# Patient Record
Sex: FEMALE | Race: BLACK OR AFRICAN AMERICAN | NOT HISPANIC OR LATINO | URBAN - METROPOLITAN AREA
[De-identification: names, ages, dates, MRNs, and addresses within clinical notes are randomized per-mention and may not be internally consistent; named-entity substitution may affect disease eponyms.]

---

## 2020-06-07 ENCOUNTER — EMERGENCY (EMERGENCY)
Facility: HOSPITAL | Age: 55
LOS: 1 days | Discharge: HOME | End: 2020-06-07
Attending: EMERGENCY MEDICINE | Admitting: EMERGENCY MEDICINE
Payer: OTHER MISCELLANEOUS

## 2020-06-07 VITALS
RESPIRATION RATE: 20 BRPM | DIASTOLIC BLOOD PRESSURE: 120 MMHG | TEMPERATURE: 98 F | HEART RATE: 127 BPM | OXYGEN SATURATION: 100 % | SYSTOLIC BLOOD PRESSURE: 198 MMHG

## 2020-06-07 LAB
ALBUMIN SERPL ELPH-MCNC: 4.1 G/DL — SIGNIFICANT CHANGE UP (ref 3.3–5)
ALP SERPL-CCNC: 59 U/L — SIGNIFICANT CHANGE UP (ref 40–120)
ALT FLD-CCNC: 11 U/L — SIGNIFICANT CHANGE UP (ref 4–33)
ANION GAP SERPL CALC-SCNC: 13 MMO/L — SIGNIFICANT CHANGE UP (ref 7–14)
AST SERPL-CCNC: 16 U/L — SIGNIFICANT CHANGE UP (ref 4–32)
BASOPHILS # BLD AUTO: 0.07 K/UL — SIGNIFICANT CHANGE UP (ref 0–0.2)
BASOPHILS NFR BLD AUTO: 0.5 % — SIGNIFICANT CHANGE UP (ref 0–2)
BILIRUB SERPL-MCNC: 0.2 MG/DL — SIGNIFICANT CHANGE UP (ref 0.2–1.2)
BUN SERPL-MCNC: 13 MG/DL — SIGNIFICANT CHANGE UP (ref 7–23)
CALCIUM SERPL-MCNC: 9.2 MG/DL — SIGNIFICANT CHANGE UP (ref 8.4–10.5)
CHLORIDE SERPL-SCNC: 104 MMOL/L — SIGNIFICANT CHANGE UP (ref 98–107)
CO2 SERPL-SCNC: 24 MMOL/L — SIGNIFICANT CHANGE UP (ref 22–31)
CREAT SERPL-MCNC: 0.64 MG/DL — SIGNIFICANT CHANGE UP (ref 0.5–1.3)
EOSINOPHIL # BLD AUTO: 0.04 K/UL — SIGNIFICANT CHANGE UP (ref 0–0.5)
EOSINOPHIL NFR BLD AUTO: 0.3 % — SIGNIFICANT CHANGE UP (ref 0–6)
GLUCOSE SERPL-MCNC: 140 MG/DL — HIGH (ref 70–99)
HCT VFR BLD CALC: 42.2 % — SIGNIFICANT CHANGE UP (ref 34.5–45)
HGB BLD-MCNC: 13.9 G/DL — SIGNIFICANT CHANGE UP (ref 11.5–15.5)
IMM GRANULOCYTES NFR BLD AUTO: 0.3 % — SIGNIFICANT CHANGE UP (ref 0–1.5)
LYMPHOCYTES # BLD AUTO: 2.95 K/UL — SIGNIFICANT CHANGE UP (ref 1–3.3)
LYMPHOCYTES # BLD AUTO: 23 % — SIGNIFICANT CHANGE UP (ref 13–44)
MAGNESIUM SERPL-MCNC: 1.8 MG/DL — SIGNIFICANT CHANGE UP (ref 1.6–2.6)
MCHC RBC-ENTMCNC: 31.8 PG — SIGNIFICANT CHANGE UP (ref 27–34)
MCHC RBC-ENTMCNC: 32.9 % — SIGNIFICANT CHANGE UP (ref 32–36)
MCV RBC AUTO: 96.6 FL — SIGNIFICANT CHANGE UP (ref 80–100)
MONOCYTES # BLD AUTO: 0.53 K/UL — SIGNIFICANT CHANGE UP (ref 0–0.9)
MONOCYTES NFR BLD AUTO: 4.1 % — SIGNIFICANT CHANGE UP (ref 2–14)
NEUTROPHILS # BLD AUTO: 9.19 K/UL — HIGH (ref 1.8–7.4)
NEUTROPHILS NFR BLD AUTO: 71.8 % — SIGNIFICANT CHANGE UP (ref 43–77)
NRBC # FLD: 0 K/UL — SIGNIFICANT CHANGE UP (ref 0–0)
PHOSPHATE SERPL-MCNC: 2.8 MG/DL — SIGNIFICANT CHANGE UP (ref 2.5–4.5)
PLATELET # BLD AUTO: 207 K/UL — SIGNIFICANT CHANGE UP (ref 150–400)
PMV BLD: 11.1 FL — SIGNIFICANT CHANGE UP (ref 7–13)
POTASSIUM SERPL-MCNC: 3.7 MMOL/L — SIGNIFICANT CHANGE UP (ref 3.5–5.3)
POTASSIUM SERPL-SCNC: 3.7 MMOL/L — SIGNIFICANT CHANGE UP (ref 3.5–5.3)
PROT SERPL-MCNC: 7.1 G/DL — SIGNIFICANT CHANGE UP (ref 6–8.3)
RBC # BLD: 4.37 M/UL — SIGNIFICANT CHANGE UP (ref 3.8–5.2)
RBC # FLD: 13.2 % — SIGNIFICANT CHANGE UP (ref 10.3–14.5)
SODIUM SERPL-SCNC: 141 MMOL/L — SIGNIFICANT CHANGE UP (ref 135–145)
WBC # BLD: 12.82 K/UL — HIGH (ref 3.8–10.5)
WBC # FLD AUTO: 12.82 K/UL — HIGH (ref 3.8–10.5)

## 2020-06-07 PROCEDURE — 99218: CPT

## 2020-06-07 PROCEDURE — 72156 MRI NECK SPINE W/O & W/DYE: CPT | Mod: 26

## 2020-06-07 RX ORDER — TRAMADOL HYDROCHLORIDE 50 MG/1
50 TABLET ORAL ONCE
Refills: 0 | Status: DISCONTINUED | OUTPATIENT
Start: 2020-06-07 | End: 2020-06-07

## 2020-06-07 RX ORDER — MORPHINE SULFATE 50 MG/1
4 CAPSULE, EXTENDED RELEASE ORAL ONCE
Refills: 0 | Status: DISCONTINUED | OUTPATIENT
Start: 2020-06-07 | End: 2020-06-07

## 2020-06-07 RX ORDER — KETAMINE HYDROCHLORIDE 100 MG/ML
20 INJECTION INTRAMUSCULAR; INTRAVENOUS ONCE
Refills: 0 | Status: DISCONTINUED | OUTPATIENT
Start: 2020-06-07 | End: 2020-06-07

## 2020-06-07 RX ORDER — ACETAMINOPHEN 500 MG
650 TABLET ORAL ONCE
Refills: 0 | Status: COMPLETED | OUTPATIENT
Start: 2020-06-07 | End: 2020-06-07

## 2020-06-07 RX ORDER — KETOROLAC TROMETHAMINE 30 MG/ML
30 SYRINGE (ML) INJECTION ONCE
Refills: 0 | Status: DISCONTINUED | OUTPATIENT
Start: 2020-06-07 | End: 2020-06-07

## 2020-06-07 RX ORDER — LIDOCAINE 4 G/100G
1 CREAM TOPICAL ONCE
Refills: 0 | Status: COMPLETED | OUTPATIENT
Start: 2020-06-07 | End: 2020-06-07

## 2020-06-07 RX ORDER — KETOROLAC TROMETHAMINE 30 MG/ML
30 SYRINGE (ML) INJECTION ONCE
Refills: 0 | Status: DISCONTINUED | OUTPATIENT
Start: 2020-06-07 | End: 2020-06-08

## 2020-06-07 RX ORDER — IBUPROFEN 200 MG
600 TABLET ORAL ONCE
Refills: 0 | Status: COMPLETED | OUTPATIENT
Start: 2020-06-07 | End: 2020-06-07

## 2020-06-07 RX ORDER — AMLODIPINE BESYLATE 2.5 MG/1
2.5 TABLET ORAL DAILY
Refills: 0 | Status: DISCONTINUED | OUTPATIENT
Start: 2020-06-07 | End: 2020-06-11

## 2020-06-07 RX ORDER — DIAZEPAM 5 MG
5 TABLET ORAL ONCE
Refills: 0 | Status: DISCONTINUED | OUTPATIENT
Start: 2020-06-07 | End: 2020-06-07

## 2020-06-07 RX ADMIN — MORPHINE SULFATE 4 MILLIGRAM(S): 50 CAPSULE, EXTENDED RELEASE ORAL at 18:16

## 2020-06-07 RX ADMIN — Medication 30 MILLIGRAM(S): at 16:03

## 2020-06-07 RX ADMIN — LIDOCAINE 1 PATCH: 4 CREAM TOPICAL at 05:49

## 2020-06-07 RX ADMIN — Medication 650 MILLIGRAM(S): at 05:49

## 2020-06-07 RX ADMIN — Medication 600 MILLIGRAM(S): at 05:49

## 2020-06-07 RX ADMIN — Medication 5 MILLIGRAM(S): at 06:01

## 2020-06-07 RX ADMIN — LIDOCAINE 1 PATCH: 4 CREAM TOPICAL at 17:40

## 2020-06-07 RX ADMIN — TRAMADOL HYDROCHLORIDE 50 MILLIGRAM(S): 50 TABLET ORAL at 21:14

## 2020-06-07 RX ADMIN — MORPHINE SULFATE 4 MILLIGRAM(S): 50 CAPSULE, EXTENDED RELEASE ORAL at 09:45

## 2020-06-07 RX ADMIN — KETAMINE HYDROCHLORIDE 400 MILLIGRAM(S): 100 INJECTION INTRAMUSCULAR; INTRAVENOUS at 13:03

## 2020-06-07 NOTE — ED PROVIDER NOTE - ATTENDING CONTRIBUTION TO CARE
micah 53 y/o F with h/o chronic neck pain s/p work injury (pt follows with spine and pain management) here with worsening of chronic neck pain over the past few days.  Pt reports pain radiating to left shoulder, worse with any movement.  Pt reports she had last received steroid injection in April 2020 from pain management, referred back to her spine surgeon and is pending surgery.  No new injury or fall.  No fever, chills, rash, swelling, numbness, tingling, ha.  Pt standing next to stretcher, pacing and screaming, appears uncomfortable in pain.  Afeb, tachycardic, hypertensive.  NCAT, EOMI, PERRL.  Diffuse tenderness to left paracervical and along left neck to shoulder.  Lungs cta bl.  Cards +S1/S2, no MRG.  Distal radial pulses 2+ equal and full, good peripheral perfusion, skin warm and normal cap refill.  Sensation grossly normal in bilat upper ext,  Strength limited by pain, but able to move bilat ext equally with encouragement, gait steady.  No other spinal tenderness, pelvic stable.  Plan for pain control and reassess - likely exacerbation of underlying chronic pain, will need outpatient follow-up with her spine surgeon for definitive management.

## 2020-06-07 NOTE — ED PROVIDER NOTE - OBJECTIVE STATEMENT
53 y/o F w/ hx chronic neck pain x2 years, c-spine disc herniation after work injury 2018, presents with acute on chronic neck pain. States she has had multiple cervical steroid injections, last one was 3/28, which sometimes bring temporary relief. For the past 2 days however, neck pain has been gradually worsening, intermittent, exacerbated by movement. Last MRI 12/2019 53 y/o F w/ hx chronic neck pain x2 years, c-spine disc herniation after work injury 2018, presents with acute on chronic neck pain. States she has had multiple cervical steroid injections, last one was 3/28, which sometimes bring temporary relief. For the past 2 days however, neck pain has been gradually worsening, intermittent, exacerbated by movement. Last MRI 12/2019. Pain down L arm, numbness/tingling.    MRI 9/2019 ([facility]) impression: C3-C4 level disc bulge, degenerative spondylosis, prominent right sided endplate changes and uncovertebral joint hypertrophy, moderate impingement upon right neural foramen, impingement upon anterior thecal sac. C5-C6 broad-based disc bulge and posterior central focal herniation with annular fissure causing impingement upon anterior thecal sac and cord. C6-C7 level demonstrates broad-based disc bulge and left paracentral focal herniation with annular fissure, impingement.     Orthopedics: [facility] orth[facility]ics/center for pain management 53 y/o F w/ hx chronic neck pain x2 years, c-spine disc herniation after work injury 2018, presents with acute on chronic neck pain. States she has had multiple cervical steroid injections, last one was 3/28, which sometimes bring temporary relief. For the past 2 days however, neck pain has been gradually worsening, intermittent, exacerbated by movement. Last MRI 12/2019. Pain down L arm, numbness/tingling. Pt supposed to f/u with Spine surgeon Sushant, who will possibly do surgery on her cervical spine.     MRI 9/2019 ([facility]) impression: C3-C4 level disc bulge, degenerative spondylosis, prominent right sided endplate changes and uncovertebral joint hypertrophy, moderate impingement upon right neural foramen, impingement upon anterior thecal sac. C5-C6 broad-based disc bulge and posterior central focal herniation with annular fissure causing impingement upon anterior thecal sac and cord. C6-C7 level demonstrates broad-based disc bulge and left paracentral focal herniation with annular fissure, impingement.     Pmh: c-spine herniation  Meds: gabapentin, flexeril, fenoprofen, tramadol, amlodipine  Allergies:NKDA  Social: Cigarette use, denies illicit drug use  Surg: shoulder surgery    Orthopedics: [facility] orthoped[facility]/center for pain management  Spine surgeon: Jonathan Canchola MD ([facility])

## 2020-06-07 NOTE — ED CDU PROVIDER INITIAL DAY NOTE - OBJECTIVE STATEMENT
55 y/o F w/ hx chronic neck pain x2 years, c-spine disc herniation after work injury 2018, presents with acute on chronic neck pain. States she has had multiple cervical steroid injections, last one was 3/28, which sometimes bring temporary relief. For the past 2 days however, neck pain has been gradually worsening, intermittent, exacerbated by movement. Last MRI 12/2019. Pain down L arm, numbness/tingling. Pt supposed to f/u with Spine surgeon Sushant, who will possibly do surgery on her cervical spine.     cdu zach amado: hpi reviewed above. 54 y.o female with acute on chronic neck pain sent to CDU for MRI, pain control.

## 2020-06-07 NOTE — ED ADULT NURSE NOTE - OBJECTIVE STATEMENT
54y old female, a&ox4, ambulatory at baseline, c/o left sided shoulder pain radiating to neck. patient hysterical at this time, appears uncomfortable, stating she has spinal herniation and had epidural injection in March.  Denies trauma to area. Unable to complete questionnaire due to patient crying and yelling in pain. Medicated as per MD orders, appears in no respiratory distress at this time. Breathing equal and unlabored. MD [place]ak at bedside.

## 2020-06-07 NOTE — ED ADULT NURSE REASSESSMENT NOTE - NS ED NURSE REASSESS COMMENT FT1
Report received from day RN. Patient uncomfortable, reporting 10/10 pain. Medicated as per orders. Hot packs provided. CDU PA at bedside. VS as noted. Will continue to monitor.

## 2020-06-07 NOTE — ED ADULT NURSE NOTE - NSIMPLEMENTINTERV_GEN_ALL_ED
Implemented All Universal Safety Interventions:  [place] to call system. Call bell, personal items and telephone within reach. Instruct patient to call for assistance. Room bathroom lighting operational. Non-slip footwear when patient is off stretcher. Physically safe environment: no spills, clutter or unnecessary equipment. Stretcher in lowest position, wheels locked, appropriate side rails in place.

## 2020-06-07 NOTE — ED ADULT NURSE NOTE - NS PRO PASSIVE SMOKE EXP
Hypertension is well controlled.   Continue current treatment regimen.  Dietary sodium restriction.  Continue current medications.  Blood pressure will be reassessed at the next regular appointment.   Unknown

## 2020-06-07 NOTE — ED PROVIDER NOTE - CLINICAL SUMMARY MEDICAL DECISION MAKING FREE TEXT BOX
53 y/o F w/ acute on chronic neck pain, cervical disk herniation/degeneration 2018, treated at [facility]/pain management. No recent injury/trauma/fall. Steroid injections in neck, last one 03/2020. PEx reveals mild cervical tenderness, weakness left hand . Symptomatic control with ibuprofen, APAP, lidopatch, valium. Reassess. 53 y/o F w/ acute on chronic neck pain.  Pt appears uncomfortable, but moving all extremities.  LUE NVI, strength limited by pain but normal with good effort.  Will pain control, reassess.

## 2020-06-07 NOTE — ED ADULT TRIAGE NOTE - CHIEF COMPLAINT QUOTE
neck and shoulder  pain    pt has hx of cervical spine herniation and herniation... had epidural steroid injection on Monday... states pain from left side of neck rad to shoulder down arm to hand ... worsened last night with numbness, tingling. pt uncomfortable, tearful

## 2020-06-07 NOTE — ED ADULT NURSE REASSESSMENT NOTE - NS ED NURSE REASSESS COMMENT FT1
break coverage RN: pt received back from MRI, pt still c/o 10/10 pain. notified Dr. Anne of pt pain. Resp even and unlabored, VS as noted. Provided pt with pillows and assisted with repositioning for comfort. pending MRI results at this time. will continue to monitor.

## 2020-06-07 NOTE — ED PROVIDER NOTE - PHYSICAL EXAMINATION
[Const] pt uncomfortable, tearful, requesting more pain medications  [HEENT] PERRL, EOMI, moist mucus membranes  [Neck] Supple, trachea midline  [CV] +S1/S2, no m/r/g appreciated  [Lungs] Clear to auscultations bilaterally, no adventitious lung sounds  [Abd] soft, non-tender, nondistended in all 4 quadrants  [MSK] mild cervical midline tenderness at C3-C4 level, mild left paraspinal tenderness to palpation, +spurling, rt hand  5/5, left hand  4/5  [Skin] warm, dry, well-perfused  [Neuro] A&Ox3, Cranial Nerves II-XII intact [Const] pt uncomfortable, tearful, requesting more pain medications  [HEENT] PERRL, EOMI, moist mucus membranes  [Neck] Supple, trachea midline  [CV] +S1/S2, no m/r/g appreciated  [Lungs] Clear to auscultations bilaterally, no adventitious lung sounds  [Abd] soft, non-tender, nondistended in all 4 quadrants  [MSK] L paracervical ttp, L trapezius ttp, no gross deformities  [Skin] warm, dry, well-perfused  [Neuro] A&Ox3, Cranial Nerves II-XII intact

## 2020-06-08 VITALS
SYSTOLIC BLOOD PRESSURE: 136 MMHG | TEMPERATURE: 99 F | RESPIRATION RATE: 17 BRPM | DIASTOLIC BLOOD PRESSURE: 97 MMHG | HEART RATE: 84 BPM | OXYGEN SATURATION: 98 %

## 2020-06-08 LAB — SARS-COV-2 RNA SPEC QL NAA+PROBE: SIGNIFICANT CHANGE UP

## 2020-06-08 PROCEDURE — 99217: CPT

## 2020-06-08 RX ORDER — KETOROLAC TROMETHAMINE 30 MG/ML
30 SYRINGE (ML) INJECTION ONCE
Refills: 0 | Status: DISCONTINUED | OUTPATIENT
Start: 2020-06-08 | End: 2020-06-08

## 2020-06-08 RX ORDER — LIDOCAINE 4 G/100G
1 CREAM TOPICAL ONCE
Refills: 0 | Status: COMPLETED | OUTPATIENT
Start: 2020-06-08 | End: 2020-06-08

## 2020-06-08 RX ORDER — LIDOCAINE 4 G/100G
1 CREAM TOPICAL
Qty: 20 | Refills: 0
Start: 2020-06-08

## 2020-06-08 RX ORDER — ACETAMINOPHEN 500 MG
1 TABLET ORAL
Qty: 40 | Refills: 0
Start: 2020-06-08

## 2020-06-08 RX ORDER — CYCLOBENZAPRINE HYDROCHLORIDE 10 MG/1
10 TABLET, FILM COATED ORAL ONCE
Refills: 0 | Status: COMPLETED | OUTPATIENT
Start: 2020-06-08 | End: 2020-06-08

## 2020-06-08 RX ORDER — OXYCODONE AND ACETAMINOPHEN 5; 325 MG/1; MG/1
1 TABLET ORAL ONCE
Refills: 0 | Status: DISCONTINUED | OUTPATIENT
Start: 2020-06-08 | End: 2020-06-08

## 2020-06-08 RX ADMIN — CYCLOBENZAPRINE HYDROCHLORIDE 10 MILLIGRAM(S): 10 TABLET, FILM COATED ORAL at 08:29

## 2020-06-08 RX ADMIN — Medication 30 MILLIGRAM(S): at 08:29

## 2020-06-08 RX ADMIN — LIDOCAINE 1 PATCH: 4 CREAM TOPICAL at 06:59

## 2020-06-08 RX ADMIN — OXYCODONE AND ACETAMINOPHEN 1 TABLET(S): 5; 325 TABLET ORAL at 06:59

## 2020-06-08 RX ADMIN — AMLODIPINE BESYLATE 2.5 MILLIGRAM(S): 2.5 TABLET ORAL at 06:18

## 2020-06-08 RX ADMIN — LIDOCAINE 1 PATCH: 4 CREAM TOPICAL at 07:25

## 2020-06-08 RX ADMIN — Medication 30 MILLIGRAM(S): at 00:50

## 2020-06-08 NOTE — ED CDU PROVIDER DISPOSITION NOTE - CLINICAL COURSE
54 y.o female with acute on chronic neck pain sent to CDU for MRI, pain control. MRI reveals Degenerative changes throughout c-spine, most notable degree of canal stenosis at C5/C6 and right foraminal narrowing at C3/C4 and C4 C5/C6.  Pt understands she needs to follow up with her spine surgeon and pain control

## 2020-06-08 NOTE — ED CDU PROVIDER SUBSEQUENT DAY NOTE - PROGRESS NOTE DETAILS
Pt reports slight improvement in pain. Mostly c/o L upper back pain at the moment. NAD, VSS. Observed pt resting comfortably, on her L side, R side, on her cell phone. When talking to pt she becomes tearful due to the pain. Pt has a pain management doctor and advised f/u. Pt given disk image of MRI report.

## 2020-06-08 NOTE — ED CDU PROVIDER SUBSEQUENT DAY NOTE - MEDICAL DECISION MAKING DETAILS
54 y.o female with acute on chronic neck pain sent to CDU for MRI, pain control.  MRI reveals Degenerative changes throughout c-spine, most notable degree of canal stenosis at C5/C6 and right foraminal narrowing at C3/C4 and C4 C5/C6.  Pt follows with Dr. Jonathan Canchola ([facility])   Plan for pain control and reassessment in the morning. Pt understands she needs to follow up with her spine surgeon and pain control for further intervention.

## 2020-06-08 NOTE — ED CDU PROVIDER SUBSEQUENT DAY NOTE - HISTORY
54 y.o female with acute on chronic neck pain sent to CDU for MRI, pain control.  MRI reveals Degenerative changes throughout c-spine, most notable degree of canal stenosis at C5/C6 and right foraminal narrowing at C3/C4 and C4 C5/C6.  Pt follows with Dr. Jonathan Canchola ([facility])   Plan for pain control and reassessment in the morning. Pt understands she needs to follow up with her spine surgeon and pain control for further intervention.

## 2020-06-08 NOTE — ED CDU PROVIDER DISPOSITION NOTE - PATIENT PORTAL LINK FT
You can access the FollowMyHealth Patient Portal offered by [facility] by registering at the following website: [email or web address]. By joining [facility]’s FollowMyHealth portal, you will also be able to view your health information using other applications (apps) compatible with our system.

## 2020-06-08 NOTE — ED CDU PROVIDER DISPOSITION NOTE - NSFOLLOWUPINSTRUCTIONS_ED_ALL_ED_FT
Please follow up with your spine surgeon and your pain management doctor.  Take your pain medication as directed.    Advance activity as tolerated.  Continue all previously prescribed medications as directed unless otherwise instructed.  Follow up with your primary care physician in 48-72 hours- bring copies of your results.  Return to the ER for worsening or persistent symptoms, and/or ANY NEW OR CONCERNING SYMPTOMS. If you have issues obtaining follow up, please call: [phone] to obtain a doctor or specialist who takes your insurance in your area.  You may call [phone] to make an appointment with the internal medicine clinic.

## 2020-06-09 RX ORDER — LIDOCAINE 4 G/100G
1 CREAM TOPICAL
Qty: 20 | Refills: 0
Start: 2020-06-09

## 2020-06-09 NOTE — ED POST DISCHARGE NOTE - ADDITIONAL DOCUMENTATION
ALIA Fisher: Pt called, her pharmacy did not receive rx for lidoderm patches, resent rx for lidoderm patches

## 2020-12-01 NOTE — ED ADULT TRIAGE NOTE - SPO2 (%)
Group Topic: [facility] Behavioral Activation Group    Date: 11/30/2020  Start Time:  7:30 PM  End Time:  8:30 PM  Facilitators: Shanell Lloyd LPC    Focus: Goals  Number in attendance: 8    This visit is being performed via video Clinician Location: [facility]     Pt is in [facility] [place] and pt's identity has been established. Pt understands that we are limiting office visits due to the coronavirus pandemic and was informed that consent to treat includes permission to submit charges to pt's insurance. It was also shared that without being seen and evaluated in person, there is a risk that the information and/or assessment may be incomplete or inaccurate. 60 minutes were spent in this encounter. Shanell Lloyd LPC-IT      Method: Group  Attendance:Â Present  Participation:Â Moderate  Patient Response:Â Attentive and Quiet  Mood:Â Anxious and Depressed  Affect: Type:Â Anxious and Depressed  Â Â Â Â Â Â Â Â Â Â Â Range: Full (normal)  Â Â Â Â Â Â Â Â Â Â Â Congruency: Congruent  Â Â Â Â Â Â Â Â Â Â Â Stability: Stable  Behavior/Socialization:Â Appropriate to group, Guarded and Withdrawn  Thought Process:Â Ruminating and Unremarkable  Task Performance:Â Follows directions  Patient Evaluation:Â Interactive when prompted    Pt stated they plan on cleaning up around the house over the next few days. For self-care she intends to walk her dog and do some crafting. Barriers she expects to face are rumination about the past and having difficult conversations with others. She will continue to learn skills to overcome these barriers. 100

## 2023-07-31 ENCOUNTER — OUTPATIENT (OUTPATIENT)
Dept: OUTPATIENT SERVICES | Facility: HOSPITAL | Age: 58
LOS: 1 days | End: 2023-07-31
Payer: COMMERCIAL

## 2023-07-31 VITALS
TEMPERATURE: 98 F | OXYGEN SATURATION: 97 % | DIASTOLIC BLOOD PRESSURE: 72 MMHG | HEIGHT: 64 IN | HEART RATE: 100 BPM | WEIGHT: 188.05 LBS | SYSTOLIC BLOOD PRESSURE: 140 MMHG | RESPIRATION RATE: 16 BRPM

## 2023-07-31 DIAGNOSIS — Z98.890 OTHER SPECIFIED POSTPROCEDURAL STATES: Chronic | ICD-10-CM

## 2023-07-31 DIAGNOSIS — Z01.818 ENCOUNTER FOR OTHER PREPROCEDURAL EXAMINATION: ICD-10-CM

## 2023-07-31 DIAGNOSIS — S46.011A STRAIN OF MUSCLE(S) AND TENDON(S) OF THE ROTATOR CUFF OF RIGHT SHOULDER, INITIAL ENCOUNTER: ICD-10-CM

## 2023-07-31 DIAGNOSIS — E11.9 TYPE 2 DIABETES MELLITUS WITHOUT COMPLICATIONS: ICD-10-CM

## 2023-07-31 DIAGNOSIS — Z98.891 HISTORY OF UTERINE SCAR FROM PREVIOUS SURGERY: Chronic | ICD-10-CM

## 2023-07-31 DIAGNOSIS — S46.011D STRAIN OF MUSCLE(S) AND TENDON(S) OF THE ROTATOR CUFF OF RIGHT SHOULDER, SUBSEQUENT ENCOUNTER: ICD-10-CM

## 2023-07-31 DIAGNOSIS — I10 ESSENTIAL (PRIMARY) HYPERTENSION: ICD-10-CM

## 2023-07-31 DIAGNOSIS — Z98.1 ARTHRODESIS STATUS: Chronic | ICD-10-CM

## 2023-07-31 LAB
A1C WITH ESTIMATED AVERAGE GLUCOSE RESULT: 6.2 % — HIGH (ref 4–5.6)
ALBUMIN SERPL ELPH-MCNC: 3.6 G/DL — SIGNIFICANT CHANGE UP (ref 3.3–5)
ALP SERPL-CCNC: 75 U/L — SIGNIFICANT CHANGE UP (ref 40–120)
ALT FLD-CCNC: 17 U/L — SIGNIFICANT CHANGE UP (ref 12–78)
ANION GAP SERPL CALC-SCNC: 5 MMOL/L — SIGNIFICANT CHANGE UP (ref 5–17)
AST SERPL-CCNC: 15 U/L — SIGNIFICANT CHANGE UP (ref 15–37)
BILIRUB SERPL-MCNC: 0.5 MG/DL — SIGNIFICANT CHANGE UP (ref 0.2–1.2)
BUN SERPL-MCNC: 11 MG/DL — SIGNIFICANT CHANGE UP (ref 7–23)
CALCIUM SERPL-MCNC: 9.4 MG/DL — SIGNIFICANT CHANGE UP (ref 8.5–10.1)
CHLORIDE SERPL-SCNC: 107 MMOL/L — SIGNIFICANT CHANGE UP (ref 96–108)
CO2 SERPL-SCNC: 29 MMOL/L — SIGNIFICANT CHANGE UP (ref 22–31)
CREAT SERPL-MCNC: 0.89 MG/DL — SIGNIFICANT CHANGE UP (ref 0.5–1.3)
EGFR: 76 ML/MIN/1.73M2 — SIGNIFICANT CHANGE UP
ESTIMATED AVERAGE GLUCOSE: 131 MG/DL — HIGH (ref 68–114)
GLUCOSE SERPL-MCNC: 133 MG/DL — HIGH (ref 70–99)
HCT VFR BLD CALC: 43.2 % — SIGNIFICANT CHANGE UP (ref 34.5–45)
HGB BLD-MCNC: 14.2 G/DL — SIGNIFICANT CHANGE UP (ref 11.5–15.5)
MCHC RBC-ENTMCNC: 31.5 PG — SIGNIFICANT CHANGE UP (ref 27–34)
MCHC RBC-ENTMCNC: 32.9 GM/DL — SIGNIFICANT CHANGE UP (ref 32–36)
MCV RBC AUTO: 95.8 FL — SIGNIFICANT CHANGE UP (ref 80–100)
NRBC # BLD: 0 /100 WBCS — SIGNIFICANT CHANGE UP (ref 0–0)
PLATELET # BLD AUTO: 207 K/UL — SIGNIFICANT CHANGE UP (ref 150–400)
POTASSIUM SERPL-MCNC: 3.9 MMOL/L — SIGNIFICANT CHANGE UP (ref 3.5–5.3)
POTASSIUM SERPL-SCNC: 3.9 MMOL/L — SIGNIFICANT CHANGE UP (ref 3.5–5.3)
PROT SERPL-MCNC: 7.6 G/DL — SIGNIFICANT CHANGE UP (ref 6–8.3)
RBC # BLD: 4.51 M/UL — SIGNIFICANT CHANGE UP (ref 3.8–5.2)
RBC # FLD: 14.6 % — HIGH (ref 10.3–14.5)
SODIUM SERPL-SCNC: 141 MMOL/L — SIGNIFICANT CHANGE UP (ref 135–145)
WBC # BLD: 12.63 K/UL — HIGH (ref 3.8–10.5)
WBC # FLD AUTO: 12.63 K/UL — HIGH (ref 3.8–10.5)

## 2023-07-31 PROCEDURE — 93005 ELECTROCARDIOGRAM TRACING: CPT

## 2023-07-31 PROCEDURE — 83036 HEMOGLOBIN GLYCOSYLATED A1C: CPT

## 2023-07-31 PROCEDURE — 36415 COLL VENOUS BLD VENIPUNCTURE: CPT

## 2023-07-31 PROCEDURE — 85027 COMPLETE CBC AUTOMATED: CPT

## 2023-07-31 PROCEDURE — G0463: CPT

## 2023-07-31 PROCEDURE — 80053 COMPREHEN METABOLIC PANEL: CPT

## 2023-07-31 PROCEDURE — 93010 ELECTROCARDIOGRAM REPORT: CPT

## 2023-07-31 NOTE — H&P PST ADULT - NSICDXPASTSURGICALHX_GEN_ALL_CORE_FT
PAST SURGICAL HISTORY:  H/O breast surgery     H/O myomectomy     H/O:      History of arthroscopy of right shoulder     History of colonoscopy     S/P cervical spinal fusion

## 2023-07-31 NOTE — H&P PST ADULT - PROBLEM SELECTOR PLAN 1
Right shoulder arthroscopy-possible rotator cuff repair or repair patch debridement/decompression on 8/9/23  Labs- CBC, CMP, HB A1C, EKG  Pre op instructions discussed including skin prep, verbalized understanding  Pre op medical evaluation

## 2023-07-31 NOTE — H&P PST ADULT - HISTORY OF PRESENT ILLNESS
58 yo F with h/o HTN, DM2, OA, s/p occupational injury on 7/16/2018, s/p right shoulder surgery in 2019 c/o right shoulder  and neck pain x one year. Had orthopedic f/u s/p MRI right shoulder revealed strain muscle & tendon rotator cuff right shoulder. Pt scheduled for right shoulder arthroscopy-possible rotator cuff repair or repair patch debridement/decompression on 8/9/23  **Denies any fever , chills, CP/SOB or rent fall/injury

## 2023-07-31 NOTE — H&P PST ADULT - NSICDXPASTMEDICALHX_GEN_ALL_CORE_FT
PAST MEDICAL HISTORY:  Benign essential HTN     Fibroid, uterine     H/O hemorrhoids     History of neck pain     History of rotator cuff syndrome     Migraine headache     Mild asthma     Seasonal allergies     Type 2 diabetes mellitus

## 2023-08-08 ENCOUNTER — TRANSCRIPTION ENCOUNTER (OUTPATIENT)
Age: 58
End: 2023-08-08

## 2023-08-08 RX ORDER — DEXTROSE 50 % IN WATER 50 %
25 SYRINGE (ML) INTRAVENOUS ONCE
Refills: 0 | Status: DISCONTINUED | OUTPATIENT
Start: 2023-08-09 | End: 2023-08-23

## 2023-08-08 RX ORDER — GLUCAGON INJECTION, SOLUTION 0.5 MG/.1ML
1 INJECTION, SOLUTION SUBCUTANEOUS ONCE
Refills: 0 | Status: DISCONTINUED | OUTPATIENT
Start: 2023-08-09 | End: 2023-08-23

## 2023-08-08 RX ORDER — DEXTROSE 50 % IN WATER 50 %
15 SYRINGE (ML) INTRAVENOUS ONCE
Refills: 0 | Status: DISCONTINUED | OUTPATIENT
Start: 2023-08-09 | End: 2023-08-23

## 2023-08-08 RX ORDER — DEXTROSE 50 % IN WATER 50 %
12.5 SYRINGE (ML) INTRAVENOUS ONCE
Refills: 0 | Status: DISCONTINUED | OUTPATIENT
Start: 2023-08-09 | End: 2023-08-23

## 2023-08-09 ENCOUNTER — TRANSCRIPTION ENCOUNTER (OUTPATIENT)
Age: 58
End: 2023-08-09

## 2023-08-09 ENCOUNTER — OUTPATIENT (OUTPATIENT)
Dept: OUTPATIENT SERVICES | Facility: HOSPITAL | Age: 58
LOS: 1 days | End: 2023-08-09
Payer: COMMERCIAL

## 2023-08-09 VITALS
TEMPERATURE: 98 F | OXYGEN SATURATION: 98 % | SYSTOLIC BLOOD PRESSURE: 136 MMHG | DIASTOLIC BLOOD PRESSURE: 78 MMHG | HEART RATE: 95 BPM | RESPIRATION RATE: 17 BRPM | WEIGHT: 188.05 LBS | HEIGHT: 64 IN

## 2023-08-09 VITALS
SYSTOLIC BLOOD PRESSURE: 139 MMHG | DIASTOLIC BLOOD PRESSURE: 77 MMHG | RESPIRATION RATE: 18 BRPM | OXYGEN SATURATION: 96 % | HEART RATE: 103 BPM

## 2023-08-09 DIAGNOSIS — Z98.1 ARTHRODESIS STATUS: Chronic | ICD-10-CM

## 2023-08-09 DIAGNOSIS — Z98.890 OTHER SPECIFIED POSTPROCEDURAL STATES: Chronic | ICD-10-CM

## 2023-08-09 DIAGNOSIS — S46.011D STRAIN OF MUSCLE(S) AND TENDON(S) OF THE ROTATOR CUFF OF RIGHT SHOULDER, SUBSEQUENT ENCOUNTER: ICD-10-CM

## 2023-08-09 DIAGNOSIS — Z01.818 ENCOUNTER FOR OTHER PREPROCEDURAL EXAMINATION: ICD-10-CM

## 2023-08-09 DIAGNOSIS — Z98.891 HISTORY OF UTERINE SCAR FROM PREVIOUS SURGERY: Chronic | ICD-10-CM

## 2023-08-09 PROCEDURE — C1763: CPT

## 2023-08-09 PROCEDURE — C1889: CPT

## 2023-08-09 PROCEDURE — 29827 SHO ARTHRS SRG RT8TR CUF RPR: CPT | Mod: RT

## 2023-08-09 PROCEDURE — 29821 SHO ARTHRS SRG COMPL SYNVCT: CPT | Mod: RT

## 2023-08-09 PROCEDURE — C1713: CPT

## 2023-08-09 PROCEDURE — 94640 AIRWAY INHALATION TREATMENT: CPT

## 2023-08-09 PROCEDURE — 29823 SHO ARTHRS SRG XTNSV DBRDMT: CPT | Mod: RT

## 2023-08-09 PROCEDURE — 88304 TISSUE EXAM BY PATHOLOGIST: CPT | Mod: 26

## 2023-08-09 PROCEDURE — 29826 SHO ARTHRS SRG DECOMPRESSION: CPT | Mod: RT

## 2023-08-09 PROCEDURE — 82962 GLUCOSE BLOOD TEST: CPT

## 2023-08-09 PROCEDURE — 88304 TISSUE EXAM BY PATHOLOGIST: CPT

## 2023-08-09 DEVICE — ANCHOR ARTHROSCOPIC DEL SYS ADVANCED: Type: IMPLANTABLE DEVICE | Site: RIGHT | Status: FUNCTIONAL

## 2023-08-09 DEVICE — STAPLE TENDON: Type: IMPLANTABLE DEVICE | Site: RIGHT | Status: FUNCTIONAL

## 2023-08-09 DEVICE — S&N BIOINDUCTIVE IMPLANT W/ ARTHROSCOPIC DELIVERY SYSTEM MEDIUM: Type: IMPLANTABLE DEVICE | Site: RIGHT | Status: FUNCTIONAL

## 2023-08-09 RX ORDER — OXYCODONE AND ACETAMINOPHEN 5; 325 MG/1; MG/1
1 TABLET ORAL
Refills: 0 | DISCHARGE

## 2023-08-09 RX ORDER — MONTELUKAST 4 MG/1
1 TABLET, CHEWABLE ORAL
Refills: 0 | DISCHARGE

## 2023-08-09 RX ORDER — ONDANSETRON 8 MG/1
4 TABLET, FILM COATED ORAL ONCE
Refills: 0 | Status: DISCONTINUED | OUTPATIENT
Start: 2023-08-09 | End: 2023-08-09

## 2023-08-09 RX ORDER — OXYCODONE AND ACETAMINOPHEN 5; 325 MG/1; MG/1
1 TABLET ORAL
Qty: 20 | Refills: 0
Start: 2023-08-09 | End: 2023-08-15

## 2023-08-09 RX ORDER — SODIUM CHLORIDE 9 MG/ML
1000 INJECTION, SOLUTION INTRAVENOUS
Refills: 0 | Status: DISCONTINUED | OUTPATIENT
Start: 2023-08-09 | End: 2023-08-09

## 2023-08-09 RX ORDER — MORPHINE SULFATE 50 MG/1
4 CAPSULE, EXTENDED RELEASE ORAL ONCE
Refills: 0 | Status: DISCONTINUED | OUTPATIENT
Start: 2023-08-09 | End: 2023-08-09

## 2023-08-09 RX ORDER — LORATADINE 10 MG/1
1 TABLET ORAL
Refills: 0 | DISCHARGE

## 2023-08-09 RX ORDER — BUTALBITAL/ACETAMINOPHEN 50MG-650MG
1 TABLET ORAL
Refills: 0 | DISCHARGE

## 2023-08-09 RX ORDER — HYDROMORPHONE HYDROCHLORIDE 2 MG/ML
1 INJECTION INTRAMUSCULAR; INTRAVENOUS; SUBCUTANEOUS
Refills: 0 | Status: DISCONTINUED | OUTPATIENT
Start: 2023-08-09 | End: 2023-08-09

## 2023-08-09 RX ORDER — AMLODIPINE BESYLATE 2.5 MG/1
1 TABLET ORAL
Refills: 0 | DISCHARGE

## 2023-08-09 RX ORDER — OXYCODONE HYDROCHLORIDE 5 MG/1
5 TABLET ORAL ONCE
Refills: 0 | Status: DISCONTINUED | OUTPATIENT
Start: 2023-08-09 | End: 2023-08-09

## 2023-08-09 RX ORDER — GABAPENTIN 400 MG/1
1 CAPSULE ORAL
Refills: 0 | DISCHARGE

## 2023-08-09 RX ORDER — PIOGLITAZONE HYDROCHLORIDE 15 MG/1
1 TABLET ORAL
Refills: 0 | DISCHARGE

## 2023-08-09 RX ORDER — MELOXICAM 15 MG/1
1 TABLET ORAL
Refills: 0 | DISCHARGE

## 2023-08-09 RX ORDER — CHOLECALCIFEROL (VITAMIN D3) 125 MCG
1 CAPSULE ORAL
Refills: 0 | DISCHARGE

## 2023-08-09 RX ORDER — CEFAZOLIN SODIUM 1 G
2000 VIAL (EA) INJECTION ONCE
Refills: 0 | Status: COMPLETED | OUTPATIENT
Start: 2023-08-09 | End: 2023-08-09

## 2023-08-09 RX ORDER — CYCLOBENZAPRINE HYDROCHLORIDE 10 MG/1
1 TABLET, FILM COATED ORAL
Refills: 0 | DISCHARGE

## 2023-08-09 RX ORDER — IPRATROPIUM/ALBUTEROL SULFATE 18-103MCG
3 AEROSOL WITH ADAPTER (GRAM) INHALATION EVERY 6 HOURS
Refills: 0 | Status: DISCONTINUED | OUTPATIENT
Start: 2023-08-09 | End: 2023-08-23

## 2023-08-09 RX ADMIN — Medication 3 MILLILITER(S): at 11:50

## 2023-08-09 RX ADMIN — HYDROMORPHONE HYDROCHLORIDE 1 MILLIGRAM(S): 2 INJECTION INTRAMUSCULAR; INTRAVENOUS; SUBCUTANEOUS at 11:45

## 2023-08-09 RX ADMIN — OXYCODONE HYDROCHLORIDE 5 MILLIGRAM(S): 5 TABLET ORAL at 12:13

## 2023-08-09 RX ADMIN — SODIUM CHLORIDE 60 MILLILITER(S): 9 INJECTION, SOLUTION INTRAVENOUS at 08:43

## 2023-08-09 RX ADMIN — SODIUM CHLORIDE 75 MILLILITER(S): 9 INJECTION, SOLUTION INTRAVENOUS at 11:44

## 2023-08-09 RX ADMIN — HYDROMORPHONE HYDROCHLORIDE 1 MILLIGRAM(S): 2 INJECTION INTRAMUSCULAR; INTRAVENOUS; SUBCUTANEOUS at 12:16

## 2023-08-09 RX ADMIN — HYDROMORPHONE HYDROCHLORIDE 1 MILLIGRAM(S): 2 INJECTION INTRAMUSCULAR; INTRAVENOUS; SUBCUTANEOUS at 12:13

## 2023-08-09 NOTE — ASU DISCHARGE PLAN (ADULT/PEDIATRIC) - ASU DC SPECIAL INSTRUCTIONSFT
Follow up with Dr Haley in 7-10 days. Call office for appointment. Take medications as prescribed. Keep dressing clean, dry, and intact. Rest, ice, and elevate affected extremity.

## 2023-08-09 NOTE — ASU DISCHARGE PLAN (ADULT/PEDIATRIC) - CARE PROVIDER_API CALL
Ziyad Haley  Orthopaedic Surgery  [address] [place] [address]  Phone: [phone]  Fax: [phone]  Follow Up Time:

## 2023-08-09 NOTE — ASU DISCHARGE PLAN (ADULT/PEDIATRIC) - NS MD DC FALL RISK RISK
For information on Fall & Injury Prevention, visit: [email or web address] OR  [email or web address] OR  https://www.cdc.gov/steadi/patient.html

## 2023-08-11 LAB — SURGICAL PATHOLOGY STUDY: SIGNIFICANT CHANGE UP

## (undated) DEVICE — SOL IRR BAG NS 0.9% 3000ML

## (undated) DEVICE — SUT OPUS SPEEDSTICH COBRAID BLUE

## (undated) DEVICE — VENODYNE/SCD SLEEVE CALF MEDIUM

## (undated) DEVICE — GLV 8 PROTEXIS (WHITE)

## (undated) DEVICE — SUT MONOSOF 3-0 18" P-12

## (undated) DEVICE — NDL SPINAL 18G X 3.5" (PINK)

## (undated) DEVICE — SUT MAXON 1 27" C-9

## (undated) DEVICE — TUBING SUCTION 20FT

## (undated) DEVICE — SUT OPUS SPEEDSTITCH NEEDLE CASSETTE INSERT

## (undated) DEVICE — SAW BLADE LINVATEC SAGITTAL MIC 9.5X25.5X0.4MM

## (undated) DEVICE — DRSG COMBINE 5X9"

## (undated) DEVICE — SLING SHOULDER IMMOBILIZER ELASTIC MALE XL

## (undated) DEVICE — DRSG KLING 2"

## (undated) DEVICE — DRSG CURITY GAUZE SPONGE 4 X 4" 12-PLY

## (undated) DEVICE — PLV-LINVATEC ARTHROSCOPIC TRAY: Type: DURABLE MEDICAL EQUIPMENT

## (undated) DEVICE — BUR LINVATEC VORTEX 4.5 GREEN

## (undated) DEVICE — SUCTION YANKAUER NO CONTROL VENT

## (undated) DEVICE — ELCTR WAND COVATOR 20

## (undated) DEVICE — PLV-LINVATEC SCOPE HD 70 DEGREE: Type: DURABLE MEDICAL EQUIPMENT

## (undated) DEVICE — EXPRESSBRAID SNGLE SZ 2 BLUE WHITE

## (undated) DEVICE — SUT PERFECTPASSER MAGNUMWIRE COBRAID BLUE

## (undated) DEVICE — SUT SMARTSTITCH PERFECTPASSER CONNECTOR

## (undated) DEVICE — SHAVER BLADE S&N FULL RADIUS ELITE 3.5MM STRAIGHT (BEIGE)

## (undated) DEVICE — SLING SHOULDER ULTRASLING LARGE

## (undated) DEVICE — S&N ARTHROCARE WAND TURBOVAC 90 DEGREE

## (undated) DEVICE — PACK SHOULDER ARTHROSCOPY

## (undated) DEVICE — CANNULA LINVATEC SHOULDER 7CM (GREY & ORANGE)

## (undated) DEVICE — PLV-SCD MACHINE: Type: DURABLE MEDICAL EQUIPMENT

## (undated) DEVICE — NDL HYPO SAFE 22G X 1.5" (BLACK)

## (undated) DEVICE — DRAPE 3/4 SHEET W REINFORCEMENT 56X77"

## (undated) DEVICE — LAP PAD W RING 18 X 18"

## (undated) DEVICE — DRSG STOCKINETTE IMPERVIOUS XL

## (undated) DEVICE — TUBING DEPUY MITEK FMS INFLOW

## (undated) DEVICE — DRAPE TOWEL BLUE 17" X 24"

## (undated) DEVICE — TUBING DEPUY MITEK FMS OUTFLOW

## (undated) DEVICE — PLV-STRYKER SYSTEM 8: Type: DURABLE MEDICAL EQUIPMENT

## (undated) DEVICE — DRAPE U POLY BLUE 60"X60"

## (undated) DEVICE — VENODYNE/SCD SLEEVE CALF LARGE

## (undated) DEVICE — SYR LUER LOK 50CC

## (undated) DEVICE — POSITIONER S&N FACE MASK SPIDER 2

## (undated) DEVICE — FLOORMAT SURGISAFE ABSORBANT WHITE 36" X 72"

## (undated) DEVICE — SUT MAXON 1 27" T-12

## (undated) DEVICE — SYR LUER LOK 20CC

## (undated) DEVICE — WARMING BLANKET LOWER ADULT

## (undated) DEVICE — DRSG ADAPTIC 3 X 3"

## (undated) DEVICE — CANNULA S&N CLEAR-TRAC FLEXIBLE THREADED 8 X 72MM

## (undated) DEVICE — TOGGELOC ZIPLOOP DISP